# Patient Record
Sex: MALE | Race: BLACK OR AFRICAN AMERICAN | URBAN - METROPOLITAN AREA
[De-identification: names, ages, dates, MRNs, and addresses within clinical notes are randomized per-mention and may not be internally consistent; named-entity substitution may affect disease eponyms.]

---

## 2024-05-16 ENCOUNTER — TELEPHONE (OUTPATIENT)
Age: 59
End: 2024-05-16

## 2024-07-29 ENCOUNTER — HOSPITAL ENCOUNTER (OUTPATIENT)
Facility: HOSPITAL | Age: 59
Setting detail: SPECIMEN
Discharge: HOME | End: 2024-08-01

## 2024-07-29 ENCOUNTER — OFFICE VISIT (OUTPATIENT)
Age: 59
End: 2024-07-29

## 2024-07-29 VITALS
OXYGEN SATURATION: 99 % | TEMPERATURE: 96.8 F | HEIGHT: 71 IN | WEIGHT: 203 LBS | BODY MASS INDEX: 28.42 KG/M2 | SYSTOLIC BLOOD PRESSURE: 128 MMHG | DIASTOLIC BLOOD PRESSURE: 79 MMHG | HEART RATE: 74 BPM

## 2024-07-29 DIAGNOSIS — M19.90 ARTHRITIS: ICD-10-CM

## 2024-07-29 DIAGNOSIS — B18.2 CHRONIC HEPATITIS C WITHOUT HEPATIC COMA (HCC): ICD-10-CM

## 2024-07-29 DIAGNOSIS — B18.2 CHRONIC HEPATITIS C WITHOUT HEPATIC COMA (HCC): Primary | ICD-10-CM

## 2024-07-29 LAB — LABCORP SPECIMEN COLLECTION: NORMAL

## 2024-07-29 PROCEDURE — 99001 SPECIMEN HANDLING PT-LAB: CPT

## 2024-07-29 RX ORDER — MELOXICAM 7.5 MG/1
TABLET ORAL
COMMUNITY
Start: 2024-05-15

## 2024-07-29 RX ORDER — IBUPROFEN 400 MG/1
TABLET ORAL
COMMUNITY
Start: 2024-05-16

## 2024-07-29 RX ORDER — AMLODIPINE BESYLATE 5 MG/1
5 TABLET ORAL DAILY
COMMUNITY
Start: 2024-06-19

## 2024-07-29 RX ORDER — ACETAMINOPHEN 500 MG
500 TABLET ORAL EVERY 6 HOURS PRN
COMMUNITY

## 2024-07-29 NOTE — PROGRESS NOTES
[facility] LIVER [facility]      Aaron Sherman MD, FACP, FACG, FAASLD      IAM Holcomb-C    Su Cassidy, [facility]   Candace Holmanloydarenetta, [facility]   Olimpia Josh, [facility]-  Pato Garvey, [facility]   Odalis Walker, [facility]   Kati Vera, [facility]      Liver [facility]   [address], [address]   [place]  [address] [phone]   FAX: [phone]  [facility]   [address], [address]   [place]  [address] [phone]   FAX: [phone]       Patient Care Team:  Zenia Ward PA as PCP - General (Physician Assistant)      Patient Active Problem List   Diagnosis    Chronic hepatitis C (HCC)    Hypertension    Arthritis       The clinicians listed above have asked me to see Hema Valentine in consultation regarding chronic HCV and its management.      All medical records sent by the referring physicians were reviewed including imaging studies     The patient is a 59 y.o. male who was found to have abnormalities in liver chemistries and subsequently tested positive for chronic HCV in 5/2024.      Risk factors for acquiring HCV are inhaling cocaine in the 1980s   There was no history of acute icteric hepatitis at the time of these risk factors.      Imaging of the liver was not performed.      Assessment of liver fibrosis with Fibroscan was performed in the office today.  The result was 11.0 kPa which correlates with   stage 2 fibrosis.  The CAP score of 220 suggests there is no hepatic steatosis.    The patient has never received treatment for chronic HCV.      In the office today the patient has the following symptoms:fatigue,   fevers,   Chronic back pain.  Arthralgia    The patient is not experiencing the following symptoms which are commonly seen in this liver disorder:   swelling of the abdomen,   swelling of the lower

## 2024-07-30 LAB
ALBUMIN SERPL-MCNC: 4.6 G/DL (ref 3.8–4.9)
ALP SERPL-CCNC: 146 IU/L (ref 44–121)
ALT SERPL-CCNC: 128 IU/L (ref 0–44)
AST SERPL-CCNC: 95 IU/L (ref 0–40)
BASOPHILS # BLD AUTO: 0 X10E3/UL (ref 0–0.2)
BASOPHILS NFR BLD AUTO: 1 %
BILIRUB DIRECT SERPL-MCNC: 0.17 MG/DL (ref 0–0.4)
BILIRUB SERPL-MCNC: 0.5 MG/DL (ref 0–1.2)
BUN SERPL-MCNC: 23 MG/DL (ref 8–27)
BUN/CREAT SERPL: 23 (ref 10–24)
CALCIUM SERPL-MCNC: 9.9 MG/DL (ref 8.6–10.2)
CHLORIDE SERPL-SCNC: 100 MMOL/L (ref 96–106)
CO2 SERPL-SCNC: 23 MMOL/L (ref 20–29)
CREAT SERPL-MCNC: 0.99 MG/DL (ref 0.76–1.27)
EGFRCR SERPLBLD CKD-EPI 2021: 87 ML/MIN/1.73
EOSINOPHIL # BLD AUTO: 0.1 X10E3/UL (ref 0–0.4)
EOSINOPHIL NFR BLD AUTO: 2 %
ERYTHROCYTE [DISTWIDTH] IN BLOOD BY AUTOMATED COUNT: 13.6 % (ref 11.6–15.4)
GLUCOSE SERPL-MCNC: 86 MG/DL (ref 70–99)
HBV CORE AB SERPL QL IA: NEGATIVE
HBV SURFACE AB SER QL: NON REACTIVE
HBV SURFACE AG SERPL QL IA: NEGATIVE
HCT VFR BLD AUTO: 44.6 % (ref 37.5–51)
HGB BLD-MCNC: 15.1 G/DL (ref 13–17.7)
IMM GRANULOCYTES # BLD AUTO: 0 X10E3/UL (ref 0–0.1)
IMM GRANULOCYTES NFR BLD AUTO: 0 %
LYMPHOCYTES # BLD AUTO: 1.9 X10E3/UL (ref 0.7–3.1)
LYMPHOCYTES NFR BLD AUTO: 41 %
MCH RBC QN AUTO: 27.9 PG (ref 26.6–33)
MCHC RBC AUTO-ENTMCNC: 33.9 G/DL (ref 31.5–35.7)
MCV RBC AUTO: 82 FL (ref 79–97)
MONOCYTES # BLD AUTO: 0.5 X10E3/UL (ref 0.1–0.9)
MONOCYTES NFR BLD AUTO: 12 %
NEUTROPHILS # BLD AUTO: 2 X10E3/UL (ref 1.4–7)
NEUTROPHILS NFR BLD AUTO: 44 %
PLATELET # BLD AUTO: 153 X10E3/UL (ref 150–450)
POTASSIUM SERPL-SCNC: 4.6 MMOL/L (ref 3.5–5.2)
PROT SERPL-MCNC: 7.8 G/DL (ref 6–8.5)
RBC # BLD AUTO: 5.42 X10E6/UL (ref 4.14–5.8)
SODIUM SERPL-SCNC: 138 MMOL/L (ref 134–144)
WBC # BLD AUTO: 4.5 X10E3/UL (ref 3.4–10.8)

## 2024-07-31 LAB
HCV GENTYP SERPL NAA+PROBE: NORMAL
HCV GENTYP SERPL NAA+PROBE: NORMAL
HCV RNA SERPL NAA+PROBE-ACNC: NORMAL IU/ML
HCV RNA SERPL NAA+PROBE-LOG IU: 6.37 LOG10 IU/ML
LABORATORY COMMENT REPORT: NORMAL

## 2024-08-05 ENCOUNTER — TELEPHONE (OUTPATIENT)
Age: 59
End: 2024-08-05

## 2024-08-05 NOTE — TELEPHONE ENCOUNTER
Spoke with patient and advised need to give MLS time to complete notes and develop plan. Advised as soon that is complete we will get in touch with him.     Patient verbalized understanding and has no further questions or concerns at this time.  efs           ----- Message from ERROL Kay NP sent at 8/5/2024  1:13 PM EDT -----  Jose Martins,    Mr. Valentine called and was routed to research.  He is inquiring as to the status of his prescription for treatment of HCV.  He was seen by MLS on 7.29.24.  The note is not yet complete.  The patient is asking for a call back regarding the status of him starting HCV treatment.      Thank you,  Kati

## 2024-08-23 ENCOUNTER — CLINICAL DOCUMENTATION (OUTPATIENT)
Age: 59
End: 2024-08-23

## 2024-08-23 NOTE — PROGRESS NOTES
Rcvd US report from [facility] from dos 5/9/24. States \"Findings suggesting mild hepatic parenchymal disease such as hepatic steatosis\"    Scanned into media for reference.    efs

## 2024-08-25 PROBLEM — M19.90 ARTHRITIS: Status: ACTIVE | Noted: 2024-08-25

## 2024-08-25 PROBLEM — B18.2 CHRONIC HEPATITIS C (HCC): Status: ACTIVE | Noted: 2024-08-25

## 2024-08-25 PROBLEM — I10 HYPERTENSION: Status: ACTIVE | Noted: 2024-08-25

## 2024-08-25 RX ORDER — VELPATASVIR AND SOFOSBUVIR 100; 400 MG/1; MG/1
1 TABLET, FILM COATED ORAL DAILY
Qty: 28 TABLET | Refills: 2 | Status: SHIPPED | OUTPATIENT
Start: 2024-08-25

## 2024-08-30 ENCOUNTER — TELEPHONE (OUTPATIENT)
Age: 59
End: 2024-08-30

## 2024-09-05 ENCOUNTER — CLINICAL DOCUMENTATION (OUTPATIENT)
Age: 59
End: 2024-09-05

## 2024-09-05 ENCOUNTER — TELEPHONE (OUTPATIENT)
Age: 59
End: 2024-09-05

## 2024-09-05 NOTE — PROGRESS NOTES
Jennifer owens from [place] for Epclusa Ref# [id number] dos 8/27/24 - 11/19/24    Scanned into media for reference    efs

## 2024-09-05 NOTE — TELEPHONE ENCOUNTER
----- Message from Dr. Aaron Sherman MD sent at 9/5/2024  6:18 AM EDT -----  Regarding: RE: \he needs to get US that was ordered if he wants insurance to approve HCV meds.  May is fine.  I must not have seen it.  MLS  ----- Message -----  From: Lakshmi Morocho LPN  Sent: 8/30/2024   3:57 PM EDT  To: Aaron Sherman MD  Subject: RE: \he needs to get US that was ordered if #    Is the one that he had done in May too old?? The report is in media for review.    Lakshmi  ----- Message -----  From: Aaron Sherman MD  Sent: 8/25/2024   6:21 AM EDT  To: Lakshmi Morocho LPN  Subject: \he needs to get US that was ordered if he w#

## 2025-01-28 ENCOUNTER — TELEPHONE (OUTPATIENT)
Age: 60
End: 2025-01-28

## 2025-04-01 ENCOUNTER — HOSPITAL ENCOUNTER (OUTPATIENT)
Facility: HOSPITAL | Age: 60
Setting detail: SPECIMEN
Discharge: HOME | End: 2025-04-04

## 2025-04-01 ENCOUNTER — OFFICE VISIT (OUTPATIENT)
Age: 60
End: 2025-04-01
Payer: COMMERCIAL

## 2025-04-01 VITALS
BODY MASS INDEX: 31.08 KG/M2 | OXYGEN SATURATION: 99 % | HEIGHT: 71 IN | WEIGHT: 222 LBS | TEMPERATURE: 98.8 F | DIASTOLIC BLOOD PRESSURE: 82 MMHG | HEART RATE: 94 BPM | SYSTOLIC BLOOD PRESSURE: 127 MMHG

## 2025-04-01 DIAGNOSIS — B18.2 CHRONIC HEPATITIS C WITHOUT HEPATIC COMA (HCC): ICD-10-CM

## 2025-04-01 DIAGNOSIS — B18.2 CHRONIC HEPATITIS C WITHOUT HEPATIC COMA (HCC): Primary | ICD-10-CM

## 2025-04-01 LAB — LABCORP SPECIMEN COLLECTION: NORMAL

## 2025-04-01 PROCEDURE — 3079F DIAST BP 80-89 MM HG: CPT | Performed by: NURSE PRACTITIONER

## 2025-04-01 PROCEDURE — 99214 OFFICE O/P EST MOD 30 MIN: CPT | Performed by: NURSE PRACTITIONER

## 2025-04-01 PROCEDURE — 3074F SYST BP LT 130 MM HG: CPT | Performed by: NURSE PRACTITIONER

## 2025-04-01 PROCEDURE — 91200 LIVER ELASTOGRAPHY: CPT | Performed by: NURSE PRACTITIONER

## 2025-04-01 PROCEDURE — 99001 SPECIMEN HANDLING PT-LAB: CPT

## 2025-04-01 RX ORDER — PREDNISONE 10 MG/1
TABLET ORAL
COMMUNITY
Start: 2025-03-12

## 2025-04-01 RX ORDER — PREDNISONE 20 MG/1
60 TABLET ORAL DAILY
COMMUNITY
Start: 2025-03-11 | End: 2025-04-01

## 2025-04-01 NOTE — PROGRESS NOTES
[facility] LIVER [facility]     Aaron Sherman MD, FACP, MACG, FAASLD   MD Maria E Rios PA-EUN Cassidy, [facility]-BC   Candace Garza, [facility]-   Olimpia Moreno, FNP-C  Pato Garvey, FN-C   Odalis Walker, [facility]         Liver [facility]   [address], [address]   [place]  [address] [phone]   FAX: [phone]  [facility]   [address], [address]   [place]  [address] [phone]   FAX: [phone]         Patient Care Team:  Zenia Ward PA as PCP - General (Physician Assistant)      Patient Active Problem List   Diagnosis    Chronic hepatitis C (HCC)    Hypertension    Arthritis         Hema Valentine returns to the [facility] today for education and management of chronic hepatitis C virus infection.  He completed 12 weeks of anti-viral drug therapy with Epclusa around the beginning of January, 2025.  This is the only time the HCV ha sever been treated.    Fibroscan assessment was also performed during today's appointment.      The active problem list, all pertinent past medical history, medications, liver histology, radiologic findings and laboratory findings related to the liver disorder were reviewed with the patient.     The patient is a 60 y.o. male who was found to have abnormalities in liver chemistries and subsequently tested positive for chronic HCV in 5/2024.      Risk factors for acquiring HCV are inhaling cocaine in the 1980s.  There was no history of acute icteric hepatitis at the time of these risk factors.      Imaging of the liver was ordered but not performed.      The patient has no complaints which can be attributed to liver disease.    The patient completes all daily activities without any functional limitations. The patient has not experienced fatigue,

## 2025-04-03 ENCOUNTER — RESULTS FOLLOW-UP (OUTPATIENT)
Age: 60
End: 2025-04-03

## 2025-04-03 LAB
ALBUMIN SERPL-MCNC: 4.8 G/DL (ref 3.9–4.9)
ALP SERPL-CCNC: 122 IU/L (ref 44–121)
ALT SERPL-CCNC: 51 IU/L (ref 0–44)
AST SERPL-CCNC: 44 IU/L (ref 0–40)
BASOPHILS # BLD AUTO: 0 X10E3/UL (ref 0–0.2)
BASOPHILS NFR BLD AUTO: 0 %
BILIRUB DIRECT SERPL-MCNC: 0.22 MG/DL (ref 0–0.4)
BILIRUB SERPL-MCNC: 0.7 MG/DL (ref 0–1.2)
BUN SERPL-MCNC: 22 MG/DL (ref 8–27)
BUN/CREAT SERPL: 18 (ref 10–24)
CALCIUM SERPL-MCNC: 10.1 MG/DL (ref 8.6–10.2)
CHLORIDE SERPL-SCNC: 105 MMOL/L (ref 96–106)
CO2 SERPL-SCNC: 18 MMOL/L (ref 20–29)
CREAT SERPL-MCNC: 1.23 MG/DL (ref 0.76–1.27)
EGFRCR SERPLBLD CKD-EPI 2021: 67 ML/MIN/1.73
EOSINOPHIL # BLD AUTO: 0.1 X10E3/UL (ref 0–0.4)
EOSINOPHIL NFR BLD AUTO: 2 %
ERYTHROCYTE [DISTWIDTH] IN BLOOD BY AUTOMATED COUNT: 14 % (ref 11.6–15.4)
GLUCOSE SERPL-MCNC: 77 MG/DL (ref 70–99)
HBV CORE AB SERPL QL IA: NEGATIVE
HBV SURFACE AB SER QL: NON REACTIVE
HBV SURFACE AG SERPL QL IA: NEGATIVE
HCT VFR BLD AUTO: 45.5 % (ref 37.5–51)
HGB BLD-MCNC: 14.9 G/DL (ref 13–17.7)
IMM GRANULOCYTES # BLD AUTO: 0 X10E3/UL (ref 0–0.1)
IMM GRANULOCYTES NFR BLD AUTO: 0 %
LYMPHOCYTES # BLD AUTO: 2.2 X10E3/UL (ref 0.7–3.1)
LYMPHOCYTES NFR BLD AUTO: 36 %
MCH RBC QN AUTO: 28.1 PG (ref 26.6–33)
MCHC RBC AUTO-ENTMCNC: 32.7 G/DL (ref 31.5–35.7)
MCV RBC AUTO: 86 FL (ref 79–97)
MONOCYTES # BLD AUTO: 0.4 X10E3/UL (ref 0.1–0.9)
MONOCYTES NFR BLD AUTO: 7 %
NEUTROPHILS # BLD AUTO: 3.5 X10E3/UL (ref 1.4–7)
NEUTROPHILS NFR BLD AUTO: 55 %
PLATELET # BLD AUTO: 181 X10E3/UL (ref 150–450)
POTASSIUM SERPL-SCNC: 3.8 MMOL/L (ref 3.5–5.2)
PROT SERPL-MCNC: 7.9 G/DL (ref 6–8.5)
RBC # BLD AUTO: 5.31 X10E6/UL (ref 4.14–5.8)
SODIUM SERPL-SCNC: 142 MMOL/L (ref 134–144)
SPECIMEN STATUS REPORT: NORMAL
WBC # BLD AUTO: 6.2 X10E3/UL (ref 3.4–10.8)

## 2025-04-06 LAB
HCV RNA SERPL NAA+PROBE-ACNC: NORMAL IU/ML
HCV RNA SERPL NAA+PROBE-LOG IU: NORMAL LOG10 IU/ML
HEPATITIS C GENOTYPE: NORMAL
LABORATORY COMMENT REPORT: NORMAL